# Patient Record
(demographics unavailable — no encounter records)

---

## 2025-03-07 NOTE — ASSESSMENT
[FreeTextEntry1] : Ms. ISMA HOLMAN is a 80year old woman with PMHx of CAD s/p proxLAD PCI 3/17/23, HTN, HLD LDL 85, NAFLD and obesity who is presenting for follow up.   -Reviewed TTE and labs -Continue ASA 81 daily indefinitely -Continue atorvastatin 40 daily for LDL goal <70 (well controlled) -Continue losartan 100 daily and HCTZ 25 daily for HTN, well controlled -Order labs -Order TTE to rule out structural changes -Order arterial upper extremities due to abnormal carotid US  Time in encounter, including face to face visit and time reviewing chart:  32 minutes  Edgar Anderson MD, FACC Non-Invasive Cardiology [address], [address] Office: [phone]

## 2025-03-07 NOTE — REVIEW OF SYSTEMS
[Fever] : no fever [Chills] : no chills [Feeling Fatigued] : not feeling fatigued [Blurry Vision] : no blurred vision [Earache] : no earache [Sore Throat] : no sore throat [SOB] : no shortness of breath [Dyspnea on exertion] : not dyspnea during exertion [Chest Discomfort] : no chest discomfort [Lower Ext Edema] : no extremity edema [Leg Claudication] : no intermittent leg claudication [Palpitations] : no palpitations [Orthopnea] : no orthopnea [PND] : no PND [Syncope] : no syncope [Cough] : no cough [Abdominal Pain] : no abdominal pain [Change in Appetite] : no change in appetite [Dysuria] : no dysuria [Joint Pain] : no joint pain [Rash] : no rash [Dizziness] : no dizziness [Confusion] : no confusion was observed

## 2025-03-07 NOTE — REASON FOR VISIT
[Coronary Artery Disease] : coronary artery disease [FreeTextEntry1] : Ms. ISMA HOLMAN is a 80year old woman with PMHx of CAD s/p proxLAD PCI 3/17/23, HTN, HLD LDL 85, NAFLD and obesity who is presenting for follow up. Since last visit, she has felt well. She has no chest pain, SOB or palpitations. She has been feeling lethargic/fatigued, which she thinks is related to the Winter months.  TTE 2/1/24 CONCLUSIONS: 1. Left ventricular cavity is normal. Left ventricular wall thickness is normal. Left ventricular systolic function is normal with an ejection fraction of 57 % by Segal's method of disks. There are no regional wall motion abnormalities seen. 2. There is mild (grade 1) left ventricular diastolic dysfunction, with normal filling pressure. 3. Normal right ventricular cavity size, wall thickness, and normal systolic function. 4. Mild mitral regurgitation. 5. No echocardiographic evidence of pulmonary hypertension. 6. Compared to the transthoracic echocardiogram performed on 11/1/2022, there have been no significant interval changes.  [facility] 3/17/23 Conclusions:  There is significant single vessel coronary artery diseaseA successful intervention of the mLAD was performed Recommendations:  Aggressive medical therapy; The patient left the catheterization laboratory in stable condition; The patient has been instructed to follow up with the referring physician in the near future.   Carotid US 12/18/2024 CONCLUSIONS: 1. Right: proximal ICA <50% stenosis. 2. Left: proximal ICA <50% stenosis. 3. Left: ECA >50% stenosis. 4. Vertebral arteries: antegrade flow bilaterally. 5. Right subclavian artery with reduced velocities and abnormal waveforms compared to the left subclavian artery. Consider further evaluation with dedicated upper extremity arterial imaging if clinically indicated.

## 2025-03-07 NOTE — ASSESSMENT
[FreeTextEntry1] : Ms. ISMA HOLMAN is a 80year old woman with PMHx of CAD s/p proxLAD PCI 3/17/23, HTN, HLD LDL 85, NAFLD and obesity who is presenting for follow up.   -Reviewed TTE and labs -Continue ASA 81 daily indefinitely -Continue atorvastatin 40 daily for LDL goal <70 (well controlled) -Continue losartan 100 daily and HCTZ 25 daily for HTN, well controlled -Order labs -Order TTE to rule out structural changes -Order arterial upper extremities due to abnormal carotid US  Time in encounter, including face to face visit and time reviewing chart:  32 minutes  Edgar Anderson MD, FACC Non-Invasive Cardiology [address], [address] Office: [phone]

## 2025-03-07 NOTE — REASON FOR VISIT
[Coronary Artery Disease] : coronary artery disease [FreeTextEntry1] : Ms. ISMA HOLMAN is a 80year old woman with PMHx of CAD s/p proxLAD PCI 3/17/23, HTN, HLD LDL 85, NAFLD and obesity who is presenting for follow up. Since last visit, she has felt well. She has no chest pain, SOB or palpitations. She has been feeling lethargic/fatigued, which she thinks is related to the Winter months.  TTE 2/1/24 CONCLUSIONS: 1. Left ventricular cavity is normal. Left ventricular wall thickness is normal. Left ventricular systolic function is normal with an ejection fraction of 57 % by Segal's method of disks. There are no regional wall motion abnormalities seen. 2. There is mild (grade 1) left ventricular diastolic dysfunction, with normal filling pressure. 3. Normal right ventricular cavity size, wall thickness, and normal systolic function. 4. Mild mitral regurgitation. 5. No echocardiographic evidence of pulmonary hypertension. 6. Compared to the transthoracic echocardiogram performed on 11/1/2022, there have been no significant interval changes.  [facility] 3/17/23 Conclusions:  There is significant single vessel coronary artery diseaseA successful intervention of the mLAD was performed Recommendations:  Aggressive medical therapy; The patient left the catheterization laboratory in stable condition; The patient has been instructed to follow up with the referring physician in the near future.   Carotid US 12/18/2024 CONCLUSIONS: 1. Right: proximal ICA <50% stenosis. 2. Left: proximal ICA <50% stenosis. 3. Left: ECA >50% stenosis. 4. Vertebral arteries: antegrade flow bilaterally. 5. Right subclavian artery with reduced velocities and abnormal waveforms compared to the left subclavian artery. Consider further evaluation with dedicated upper extremity arterial imaging if clinically indicated.

## 2025-04-29 NOTE — ASSESSMENT
[FreeTextEntry1] : Recurrent UTIs.  Postmenopausal woman.  Discussed options with patient and her daughter including vaginal estrogen cream.  Patient denies any history of gynecological cancers.  Patient states that her GYN does not want her to start vaginal estrogen cream due to cardiac history of stent placement 2 years ago.  Patient will discuss this further with her cardiologist to determine her risks.  Another option reviewed including methenamine for UTI prevention.  Mechanism of action reviewed. Side effects reviewed.  Plan - Will get urine culture today - Patient will start cranberry pills and vitamin C tablets - If urine culture is positive we will give few days of antibiotics followed by methenamine - If urine culture is negative, start methenamine - Return to office 3 months to review and reassess [Urinary Tract Infection (599.0\N39.0)] : qualitative ~C was positive

## 2025-04-29 NOTE — HISTORY OF PRESENT ILLNESS
[FreeTextEntry1] : Genie is an 81-year-old who presents to office accompanied by her daughter.  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  Over the last 3 months patient has been having UTI-like symptoms including dysuria and pelvic pressure.  Urine cultures have been growing out E. coli and patient has been on 4 courses of antibiotics.  She was sent for a KUB x-ray by her GYN which showed a left renal lower pole 0.4 cm density which may represent a stone.  Follow-up CT scan in April 2025 shows no urinary stones, no hydronephrosis, no abnormality in the urinary bladder.  There was an incidental finding of gallstones without cholecystitis.  Patient states that she has had gallstones for many years.  Currently patient reports pelvic pressure and dysuria.

## 2025-07-29 NOTE — HISTORY OF PRESENT ILLNESS
[FreeTextEntry1] : 81-year-old with recurrent UTIs.  She has been placed on methenamine, vitamin C and cranberry and on this regimen has not had a urinary infection.  UA today is negative.  Incidental small left renal AML